# Patient Record
Sex: FEMALE | Race: WHITE | NOT HISPANIC OR LATINO | URBAN - METROPOLITAN AREA
[De-identification: names, ages, dates, MRNs, and addresses within clinical notes are randomized per-mention and may not be internally consistent; named-entity substitution may affect disease eponyms.]

---

## 2021-08-30 ENCOUNTER — EMERGENCY (EMERGENCY)
Facility: HOSPITAL | Age: 31
LOS: 0 days | Discharge: HOME | End: 2021-08-30
Attending: EMERGENCY MEDICINE | Admitting: EMERGENCY MEDICINE
Payer: COMMERCIAL

## 2021-08-30 VITALS
OXYGEN SATURATION: 99 % | SYSTOLIC BLOOD PRESSURE: 131 MMHG | WEIGHT: 134.92 LBS | HEIGHT: 66 IN | RESPIRATION RATE: 18 BRPM | DIASTOLIC BLOOD PRESSURE: 74 MMHG | TEMPERATURE: 99 F | HEART RATE: 80 BPM

## 2021-08-30 DIAGNOSIS — R00.1 BRADYCARDIA, UNSPECIFIED: ICD-10-CM

## 2021-08-30 DIAGNOSIS — N93.9 ABNORMAL UTERINE AND VAGINAL BLEEDING, UNSPECIFIED: ICD-10-CM

## 2021-08-30 DIAGNOSIS — Z79.3 LONG TERM (CURRENT) USE OF HORMONAL CONTRACEPTIVES: ICD-10-CM

## 2021-08-30 DIAGNOSIS — Z87.42 PERSONAL HISTORY OF OTHER DISEASES OF THE FEMALE GENITAL TRACT: ICD-10-CM

## 2021-08-30 LAB
ALBUMIN SERPL ELPH-MCNC: 4.5 G/DL — SIGNIFICANT CHANGE UP (ref 3.5–5.2)
ALP SERPL-CCNC: 66 U/L — SIGNIFICANT CHANGE UP (ref 30–115)
ALT FLD-CCNC: 33 U/L — SIGNIFICANT CHANGE UP (ref 0–41)
ANION GAP SERPL CALC-SCNC: 10 MMOL/L — SIGNIFICANT CHANGE UP (ref 7–14)
APPEARANCE UR: ABNORMAL
AST SERPL-CCNC: 26 U/L — SIGNIFICANT CHANGE UP (ref 0–41)
BACTERIA # UR AUTO: NEGATIVE — SIGNIFICANT CHANGE UP
BASOPHILS # BLD AUTO: 0.07 K/UL — SIGNIFICANT CHANGE UP (ref 0–0.2)
BASOPHILS NFR BLD AUTO: 0.8 % — SIGNIFICANT CHANGE UP (ref 0–1)
BILIRUB SERPL-MCNC: 0.3 MG/DL — SIGNIFICANT CHANGE UP (ref 0.2–1.2)
BILIRUB UR-MCNC: NEGATIVE — SIGNIFICANT CHANGE UP
BLD GP AB SCN SERPL QL: SIGNIFICANT CHANGE UP
BUN SERPL-MCNC: 12 MG/DL — SIGNIFICANT CHANGE UP (ref 10–20)
CALCIUM SERPL-MCNC: 9.5 MG/DL — SIGNIFICANT CHANGE UP (ref 8.5–10.1)
CHLORIDE SERPL-SCNC: 103 MMOL/L — SIGNIFICANT CHANGE UP (ref 98–110)
CO2 SERPL-SCNC: 26 MMOL/L — SIGNIFICANT CHANGE UP (ref 17–32)
COLOR SPEC: ABNORMAL
CREAT SERPL-MCNC: 0.9 MG/DL — SIGNIFICANT CHANGE UP (ref 0.7–1.5)
DIFF PNL FLD: ABNORMAL
EOSINOPHIL # BLD AUTO: 0.3 K/UL — SIGNIFICANT CHANGE UP (ref 0–0.7)
EOSINOPHIL NFR BLD AUTO: 3.3 % — SIGNIFICANT CHANGE UP (ref 0–8)
EPI CELLS # UR: 1 /HPF — SIGNIFICANT CHANGE UP (ref 0–5)
GLUCOSE SERPL-MCNC: 90 MG/DL — SIGNIFICANT CHANGE UP (ref 70–99)
GLUCOSE UR QL: NEGATIVE — SIGNIFICANT CHANGE UP
HCG SERPL QL: NEGATIVE — SIGNIFICANT CHANGE UP
HCT VFR BLD CALC: 37 % — SIGNIFICANT CHANGE UP (ref 37–47)
HGB BLD-MCNC: 12.2 G/DL — SIGNIFICANT CHANGE UP (ref 12–16)
HYALINE CASTS # UR AUTO: 0 /LPF — SIGNIFICANT CHANGE UP (ref 0–7)
IMM GRANULOCYTES NFR BLD AUTO: 0.1 % — SIGNIFICANT CHANGE UP (ref 0.1–0.3)
KETONES UR-MCNC: NEGATIVE — SIGNIFICANT CHANGE UP
LEUKOCYTE ESTERASE UR-ACNC: NEGATIVE — SIGNIFICANT CHANGE UP
LYMPHOCYTES # BLD AUTO: 3.75 K/UL — HIGH (ref 1.2–3.4)
LYMPHOCYTES # BLD AUTO: 40.8 % — SIGNIFICANT CHANGE UP (ref 20.5–51.1)
MCHC RBC-ENTMCNC: 27.9 PG — SIGNIFICANT CHANGE UP (ref 27–31)
MCHC RBC-ENTMCNC: 33 G/DL — SIGNIFICANT CHANGE UP (ref 32–37)
MCV RBC AUTO: 84.7 FL — SIGNIFICANT CHANGE UP (ref 81–99)
MONOCYTES # BLD AUTO: 0.53 K/UL — SIGNIFICANT CHANGE UP (ref 0.1–0.6)
MONOCYTES NFR BLD AUTO: 5.8 % — SIGNIFICANT CHANGE UP (ref 1.7–9.3)
NEUTROPHILS # BLD AUTO: 4.53 K/UL — SIGNIFICANT CHANGE UP (ref 1.4–6.5)
NEUTROPHILS NFR BLD AUTO: 49.2 % — SIGNIFICANT CHANGE UP (ref 42.2–75.2)
NITRITE UR-MCNC: NEGATIVE — SIGNIFICANT CHANGE UP
NRBC # BLD: 0 /100 WBCS — SIGNIFICANT CHANGE UP (ref 0–0)
PH UR: 6 — SIGNIFICANT CHANGE UP (ref 5–8)
PLATELET # BLD AUTO: 349 K/UL — SIGNIFICANT CHANGE UP (ref 130–400)
POTASSIUM SERPL-MCNC: 4.3 MMOL/L — SIGNIFICANT CHANGE UP (ref 3.5–5)
POTASSIUM SERPL-SCNC: 4.3 MMOL/L — SIGNIFICANT CHANGE UP (ref 3.5–5)
PROT SERPL-MCNC: 6.8 G/DL — SIGNIFICANT CHANGE UP (ref 6–8)
PROT UR-MCNC: SIGNIFICANT CHANGE UP
RBC # BLD: 4.37 M/UL — SIGNIFICANT CHANGE UP (ref 4.2–5.4)
RBC # FLD: 12 % — SIGNIFICANT CHANGE UP (ref 11.5–14.5)
RBC CASTS # UR COMP ASSIST: 562 /HPF — HIGH (ref 0–4)
SODIUM SERPL-SCNC: 139 MMOL/L — SIGNIFICANT CHANGE UP (ref 135–146)
SP GR SPEC: 1.01 — SIGNIFICANT CHANGE UP (ref 1.01–1.03)
UROBILINOGEN FLD QL: SIGNIFICANT CHANGE UP
WBC # BLD: 9.19 K/UL — SIGNIFICANT CHANGE UP (ref 4.8–10.8)
WBC # FLD AUTO: 9.19 K/UL — SIGNIFICANT CHANGE UP (ref 4.8–10.8)
WBC UR QL: 2 /HPF — SIGNIFICANT CHANGE UP (ref 0–5)

## 2021-08-30 PROCEDURE — 93010 ELECTROCARDIOGRAM REPORT: CPT

## 2021-08-30 PROCEDURE — 99284 EMERGENCY DEPT VISIT MOD MDM: CPT

## 2021-08-30 RX ORDER — SODIUM CHLORIDE 9 MG/ML
1000 INJECTION, SOLUTION INTRAVENOUS ONCE
Refills: 0 | Status: COMPLETED | OUTPATIENT
Start: 2021-08-30 | End: 2021-08-30

## 2021-08-30 RX ADMIN — SODIUM CHLORIDE 1000 MILLILITER(S): 9 INJECTION, SOLUTION INTRAVENOUS at 20:39

## 2021-08-30 NOTE — ED PROVIDER NOTE - OBJECTIVE STATEMENT
30 yo female, no pmh, presents to ed for vaginal bleeding, on menstrual cycle, hx of heavy cycles, was better controlled on ocps, now off ocps in an attempt to get pregnant, no specific pain or radiation, states felt lightheaded. Denies fever, chills, cp, sob, abd pain, nvd, dysuria.

## 2021-08-30 NOTE — ED PROVIDER NOTE - PATIENT PORTAL LINK FT
You can access the FollowMyHealth Patient Portal offered by [facility] by registering at the following website: [email or web address]. By joining [employer]’s FollowMyHealth portal, you will also be able to view your health information using other applications (apps) compatible with our system.

## 2021-08-30 NOTE — ED PROVIDER NOTE - PHYSICAL EXAMINATION
Physical Exam    Vital Signs: I have reviewed the initial vital signs.  Constitutional: well-nourished, appears stated age, no acute distress  Eyes: Conjunctiva pink, Sclera clear,   Cardiovascular: S1 and S2, regular rate, regular rhythm, well-perfused extremities, radial pulses equal and 2+  Respiratory: unlabored respiratory effort, clear to auscultation bilaterally no wheezing, rales and rhonchi  Gastrointestinal: soft, non-tender abdomen, no pulsatile mass, normal bowl sounds  : Chaperoned w Dr. Costa, no externa lesions, small amount of bleeding in vault, cervix closed, no cmt.   Musculoskeletal: supple neck, no lower extremity edema, no midline tenderness  Integumentary: warm, dry, no rash  Neurologic: awake, alert, nvi

## 2021-08-30 NOTE — ED PROVIDER NOTE - NSFOLLOWUPCLINICS_GEN_ALL_ED_FT
[facility]  OB/GYN  [address] [place] [address]  Phone: [phone]  Fax:   Follow Up Time: 1-3 Days

## 2021-08-30 NOTE — ED ADULT NURSE NOTE - CHIEF COMPLAINT QUOTE
Pt reports increasing vaginal bleeding over the last two days. PT states that she has a medical condition - unsure of what it is called - that makes her bleed heavy during her menstrual cycle, however the bleeding is increasingly worse at this time. Pt also reports dizziness as well. No CP, SOB, body aches or fevers.

## 2021-08-30 NOTE — ED ADULT TRIAGE NOTE - CHIEF COMPLAINT QUOTE
Pt reports increasing vaginal bleeding over the last two days. PT states that she has a medical condition that makes her bleed heavy during her menstrual cycle, however the bleeding is increasingly worse at this time. Pt also reports dizziness as well. No CP, SOB, body aches or fevers. Pt reports increasing vaginal bleeding over the last two days. PT states that she has a medical condition - unsure of what it is called - that makes her bleed heavy during her menstrual cycle, however the bleeding is increasingly worse at this time. Pt also reports dizziness as well. No CP, SOB, body aches or fevers.

## 2021-08-30 NOTE — ED PROVIDER NOTE - ATTENDING CONTRIBUTION TO CARE
30 yo F presents to ED for vaginal bleeding. Pt states that when she is not on OCPs her menstrual cycles are worse and pt has been off OCPs recently because she is trying to get pregnant. Pt does not have any SOB, palpitations, lightheadedness. No nausea or vomiting. +abdominal cramps     Const: Well nourished, well developed, appears stated age  Eyes: PERRL, no conjunctival injection  HENT:  Neck supple without meningismus   CV: RRR, Warm, well-perfused extremities  RESP: CTA B/L, no tachypnea   GI: soft, non-tender, non-distended  : os closed. Blood in vaginal vault. No active bleeding   MSK: No gross deformities appreciated  Skin: Warm, dry. No rashes    will do labs and pregnancy test

## 2021-08-30 NOTE — ED PROVIDER NOTE - CLINICAL SUMMARY MEDICAL DECISION MAKING FREE TEXT BOX
32 yo F presented to ED for vaginal bleeding. Pt not pregnant. Cramping and vaginal bleeding is due to her menstrual period. DC home

## 2021-08-30 NOTE — ED ADULT NURSE NOTE - OBJECTIVE STATEMENT
pt c/o increased vaginal bleeding during menstrual cycle. states she has intermittent dizziness, ambulated with steady gait , denies n/v/d

## 2021-08-30 NOTE — ED PROVIDER NOTE - NS ED ROS FT
Constitutional: (-) fever, (-) chills  Eyes: (-) visual changes  ENT: (-) nasal congestions  Cardiovascular: (-) chest pain, (-) syncope  Respiratory: (-) cough, (-) shortness of breath, (-) dyspnea,   Gastrointestinal: (-) vomiting, (-) diarrhea, (-)nausea,  Musculoskeletal: (-) neck pain, (-) back pain, (-) joint pain,  Integumentary: (-) rash, (-) edema, (-) bruises  Neurological: (-) headache, (-) loc, (-) dizziness, (-) tingling, (-)numbness  Peripheral Vascular: (-) leg swelling  :  (-)dysuria,  (-) hematuria, (+) vaginal bleeding.   Allergic/Immunologic: (-) pruritus

## 2021-09-08 NOTE — ED ADULT TRIAGE NOTE - STATUS:
work:  Has Edmund Juarez returned to work? [x] Yes    [] No    *Required MET Level achieved for job duties? [x] Yes    [] No   Orthopedic Limitations/  [x] Yes    [] No  If yes please list:  Pin in hip, foot pain      Orthopedic Limitations  *If patient has orthopedic issue:   Actions/  accomodations needed to make Edmund Juarez successful : n/a Orthopedic Limitations      Fall Risk  Fall risk assessed? [x] Yes      [] No    Balance Issues? [] Yes      [x] No   [x] Safety issues reviewed      Fall Risk  *If patient is a fall risk, action needed to accommodate:   n/a Fall Risk   Home Exercise  [] Yes    [x] No   Home Exercise  [x] Yes    [] No  Type: walking  Frequency:3x/wk  Duration: 35 min Home Exercise  [x] Yes    [] No  Type: walking  Frequency: 3d/wk  Duration: 35min   Angina with Activity? [] Yes    [x] No   Angina with Activity? [] Yes    [x] No   Angina with Activity?   [] Yes    [x] No     EXERCISE PLAN EXERCISE PLAN EXERCISE PLAN   *Interventions* *Interventions* *Interventions*   Exercise Prescription  (per physician & CR staff) Exercise Prescription  (per physician & CR staff) Exercise Prescription  (per physician & CR staff)   Cardiovascular Cardiovascular Cardiovascular   Mode:    [x] Treadmill (TM)  [x] Schwinn Airdyne (AD)  [x] Arms Ergometer (AE)   MODE:    [x] Treadmill (TM)  [x] Schwinn Airdyne (AD)  [x] Arms Ergometer (AE)  [] NuStep  [] Elliptical (E) MODE:    [x] Treadmill (TM)  [x] Schwinn Airdyne (AD)  [x] Arms Ergometer (AE)  [] NuStep  [] Elliptical (E)   Initial Workloads  TM: [email or web address] 2.8 METs  AD: 1.8 level = 2.8 METs  NS: 78  Flores= 2.6 METs  AE: 0.9 level = 1.9 METs Current Workloads  TM:  3.8@ 2%= 7.3 METs  AD: 2.7 level = 3.8 METs  AE: 1.0 level = 2.0 METs Current Workloads  TM: 4.0 @ 0%= 7.1 METs  AD: 2.4 level = 3.5 METs  AE: 2.0 level = 3.5 METs   Frequency:    ICR: 3x/week  Home: 2-3x/wk Frequency:   ICR: 3x/week  Home: 3x/wk Frequency:  Edmund Loving will continue exercise at  5-7 days/week Duration:   Total aerobic exercise = 21-29 min    8min/mode Duration:  Total aerobic exercises = 30-45 min     15 min/mode Duration:  Total erobic exercise =  60-90 min   Intensity:   MET Level = 2.8  RPE = 12-15 Intensity:  Max MET Level = 7.3  RPE = 12-15 Intensity:  Max MET Level = 7.3 RPE = 12-15   Progression: increase aerobic activity up to 15 min over next 4 weeks by increasing time 2-3 min/week. Progression:  Pt progressing quickly. Will increase weekly as tolerated. Progression:  Increase time/intensity when RPE <13, and HR is in [facility]   [x] Yes      [] No  Upper and Lower body strength training 2x/wk    Wt: 2#       Reps: 8-15    *Increase wt. after completing 15 reps with an RPE of <12/13. [x] Yes      [] No  Upper and Lower body strength training 2x/wk    Wt: 5#       Reps:  8-15    *Increase wt. after completing 15 reps with an RPE of <12/13. [x] Yes      [] No  Upper and Lower body strength training 2x/wk    Wt: 5#       Reps:  8-15    *Increase wt. after completing 15 reps with an RPE of <12/13.    Flexibility Flexibility Flexibility   [x] Yes      [] No  25 min session of Core Strength & Flexibility 1x/per week  Attends Core Strength & Flexibility   [x] Yes      [] No Continue Core Strength & Flexibility at home   Home Exercise  *Mani Beltran verbalizes planning to think about initiating exercise at home [address] [place] [address] planning to continue current home walking Home Exercise  *Mani Beltran verbalizes his plan to continue walking at home 3d/wk for 30 min   *Education* *Education* *Education*   RPE Scale  [x] Yes      [] No  Exercise Safety  [x] Yes      [] No  Equipment Orientation  [x] Yes      [] No  S/S to Report  [x] Yes      [] No  Warm Up/Cool Down  [x] Yes      [] No  Home Exercise  [x] Yes      [] No  All Exercise Education Completed  [x] Yes      [] No   Exercise Education Recommended    Workshops  *Improving Performance  *Balance Training & Fall Prevention  *Heart Disease Risk Reduction  *Resistance Training & Core Strength Exercise Education Attended/Date    8/2/2021  Heart Disease Risk Reduction All Sessions Completed? [x] Yes  [] No    [facility] 8/23/2021   *Goals* *Goals* *Goals*   Initial Exercise  Exercise Goals Exercise Goals    Mejia plans to:  [x] Attend exercise sessions 3x/wk  [x] initiate home exercise 2-3x/wk for 10-20 min  [x] Increase 6 min walk distance by 10%  [x] Attend Exercise workshops Herberth Keane plans to:  [x] Attend exercise sessions 3x/wk  [x] Continue home exercise 2-3x/wk for 20-30 min  [x] Increase 6 min walk distance by 10%  [x] Attend Exercise workshops Herberth Keane achieved exercise goals? Yes    [] No    [x] Increased 6 min walk distance by 10%  [x] Currently exercising 30-60 min/day, 5-7days/wk   [x] Plans to continue exercise on own  [] Plans to join a local fitness center to continue exercise  [] Does not plan to continue to exercise after rehab   Return to ADL or Hobbies:  Herberth Keane would like to improve strength and endurance. Return to ADL or Hobbies:  Herberth Keane has increased his strength and endurance. We will continue to work on improving. Return to ADL or Hobbies:  Herberth Keane has increased his strength and endurance     *MET level required for above goal:  4.0 METs MET level Achieved:  7. 3METs MET level Achieved:  7. 3METs     Individual Cardiac Treatment Plan - Nutrition  NUTRITION  ASSESSMENT/PLAN NUTRITION  REASSESSMENT NUTRITION  DISCHARGE/FOLLOW-UP   Stages of Change Stages of Change Stages of Change   [] Pre Contemplation  [] Contemplation  [x] Preparation  [] Action  [] Maintenance  [] Relapse [] Pre Contemplation  [] Contemplation  [] Preparation  [x] Action  [] Maintenance  [] Relapse [] Pre Contemplation  [] Contemplation  [] Preparation  [x] Action  [] Maintenance  [] Relapse   NUTRITION ASSESSMENT NUTRITION ASSESSMENT NUTRITION ASSESSMENT   Weight Management  Weight: 308        Height: 6'2\"   BMI: 40 Weight Management  Weight: 306                  Weight Management  Weight: 303                 Height: 6'2\"  BMI: 38.9   Eating Plan  Current eating habits: cut out red meats, more fruits and veggies, more turkey and chicken, cut out pop and monsters Eating Plan  Changes: same Eating Plan Improvements: more veggies, fresh fruits and fish   Alcohol Use  [] none          [] daily  [x] weekly      [] special   Type: one drink on weekend. Diet Assessment Tool:  RATE YOUR PLATE  *Given to patient to complete and return. Diet Assessment Tool:    Score: 61/69     Diet Assessment Tool: RATE YOUR PLATE  Score: 33/78     NUTRITION PLAN NUTRITION PLAN NUTRITION PLAN   *Interventions* *Interventions* *Interventions*   Initial Survey given Goal Setting Discussion:   [x] Yes      [] No     Follow Up Survey Reviewed & Goals Updated:     Professional Referral  Please check if needed. [] Dietitian Consult   [] Wt. Management Referral  [] Other:     Professional Referral  Please check if needed. [] Dietitian Consult   [] Wt. Management Referral  [] Other:  Professional Referral  Please check if needed. [] Dietitian Consult   [] Wt. Management Referral  [] Other:    *Education* *Education* *Education*   Nutritional Education Recommended    [x]1:1 Registered Dietitian    Workshops: 2  *Label Reading   *Menu  *Targeting Nutrition Priorities  *Fueling a Healthy Body Nutritional Education   Attended/Date    8/4/2021  1:1 Registered Dietitian All Sessions Completed?     [x] Yes  [] No    Fueling and healthy body 9/1/2021    Menu 8/11/2021   Cooking School    (x)5 sessions recommended     [] Adding Flavor  [] Fast & Healthy     Breakfasts  [] Salads & Dressings  [] Mamadou Madlonado  [] Simple Sides & Sauces  [] Appetizers &     Snacks  [] Delicious Desserts  [] Plant-Based Proteins  [] Fast Evening Meals  [] Weekend Breakfasts  [] Efficiency Cooking  [] One-Pot [employer] Completed    Attended/Date  7/30/21  Salads & Dressings    8/6/2021  Claribel Sher    8/13/2021  Simple Sides & Sauces   Cooking School    # of sessions completed:  5    apps and snacks 8/20/2021    Plant protein 9/3/2021   *Goals* *Goals* *Goals*   Nii nutritional goals are as follows:  [x] Attend Nutrition Workshops  [x] Attend 1:1   [x] Attend Cooking Classes  [x] Complete and return diet survey Nii nutritional goals are as follows:  [x] Attend Nutrition Workshops  [x] Attend 1:1   [x] Attend Cooking Classes  [] ** Alessandra Heredia achieved nutritional goals   [x] Yes    [] No  If no, why? Use knowledge gained to continue Pritikin eating plan at home     Individual Cardiac Treatment Plan - Psychosocial  PSYCHOSOCIAL  ASSESSMENT/PLAN PSYCHOSOCIAL  REASSESSMENT PSYCHOSOCIAL  DISCHARGE/FOLLOW-UP   Stages of Change Stages of Change Stages of Change   [] Pre Contemplation  [x] Contemplation  [] Preparation  [] Action  [] Maintenance  [] Relapse [] Pre Contemplation  [] Contemplation  [x] Preparation  [] Action  [] Maintenance  [] Relapse [] Pre Contemplation  [] Contemplation  [x] Preparation  [] Action  [] Maintenance  [] Relapse   PSYCHOSOCIAL ASSESSMENT PSYCHOSOCIAL ASSESSMENT PSYCHOSOCIAL ASSESSMENT   Behavioral Outcomes Behavioral Outcomes Behavioral Outcomes   Tool Used:  Anand & David, Quality of Life Index, Cardiac Version IV  *Given to patient to complete. Tool Used:    Anand & David, Quality of Life Index, Cardiac Version IV   QOL Index Score: 29  HF:28  S&E:30  P&S: 34  Family: 29 Tool Used:     Anand & Hernandez, Quality of Life Index, Cardiac Version IV  QOL Index Score: 29.82    HF:29.6  S&E:30  P&S: 30  Family: 30   PHQ-9 score 1  Depression Severity  [x]Minimal  []Mild   []Moderate  []Moderately Severe    []Severe  PHQ-9 score 0  Depression Severity  []Minimal  []Mild   []Moderate  []Moderately Severe    []Severe   Does patient have Family Support?   [x] Yes      [] No  No signs of marital/family Workshops Attended/Date      2021  Taking Charge of Stress Healthy Mind-Set Workshops  Completed  [x] Yes      [] No    New thoughts 2021    Manage moods 2021   *Goals* *Goals* *Goals*   Nii psychosocial goals are as follows:  [x] Complete HADS & Anand Hernandez Quality of Life Index, Cardiac Version IV  [x] Attend Healthy Mind-Set Workshops  [x] Reduce depression symptom severity by 1 level Nii psychosocial goals are as follows:  [x] Attend Healthy Mind-Set Workshops  [x] Reduce depression symptom severity by 1 level  [] Yuli Goodrich achieved psychosocial goals?   [x] Yes    [] No  [] Use methods learned to continue to reduce depression symptom severity by 1 level             Individual Cardiac Treatment Plan - Other:  Risk Factor/Education  RISK FACTOR  ASSESSMENT/PLAN RISK FACTOR  REASSESSMENT  RISK FACTOR   DISCHARGE/FOLLOW-UP   Stages of Change Stages of Change Stages of Change   [] Pre Contemplation  [x] Contemplation  [] Preparation  [] Action  [] Maintenance  [] Relapse [] Pre Contemplation  [] Contemplation  [] Preparation  [x] Action  [] Maintenance  [] Relapse [] Pre Contemplation  [] Contemplation  [] Preparation  [x] Action  [] Maintenance  [] Relapse   RISK FACTOR/EDUCATION ASSESSMENT RISK FACTOR/EDUCATION ASSESSMENT RISK FACTOR /EDUCATION ASSESSMENT   Hypertension  [x] Yes      [] No    Resting BP: 144/84  Peak Ex BP:160/88  Medication: lisinopril   Hypertension  Resting BP: 150/82  Peak Ex BP:210/100  Medication Changes:  [] Yes      [x] No Hypertension  Resting BP: 126/82  Peak Ex BP:180/88  Medication Changes:  [] Yes      [x] No   Lipids  HLD/DLD  [x] Yes      [] No  TOTAL CHOL: 166  HDL:  33  LDL:  90  TRI  Medication: atorvastatin (LIPITOR) Lipids  Medication Changes:  [] Yes      [x] No     Lipids  No new redraw     Medication Changes:  [] Yes      [x] No   Diabetes  [] Yes      [x] No    Diabetes       Tobacco Use  [x]Current  []Former  []Never     Date Quit: by thanksgiving     Quit date set:  [x] Yes      [] No    # cigarettes smoked/day: 0.5per day     Smokeless Tobacco use:   [x] Yes      [] No  Amount: 0.5can per day  Tobacco Use  Change in smoking status   [] Yes      [x] No       Tobacco Use  Change in smoking status   [x] Yes      [] No    Quit date: 9/4/2021          Learning Barriers  Please select one:  []Speech  []Literacy  []Hearing  []Cognitive  []Vision  [x]Ready to Learn Learning Barriers Addressed:   Ready to learn   Learning Barriers Addressed:  [x] Yes      [] No     RISK FACTOR/EDUCATION PLAN RISK FACTOR/EDUCATION PLAN RISK FACTOR/EDUCATION PLAN   *Interventions* *Interventions* *Interventions*   Core Educational Videos    [x] Overview of The Pritikin Eating Plan  [] Heart Disease Risk Reduction  [] Move It! [] Calorie Density  [] Healthy Minds, Bodies, Hearts  [] Label Reading  [] Metabolic Syndrome & Belly   Or  [] How Our Thoughts Can Heal our Heart  [] Dining Out-Part 1  [] Biomechanical Limitations  [] Facts on Fat  [] Hypertension & Heart Disease  [] Diseases of Our Times-Focusing on Diabetes  [] Body Composition  [] Nurtition Action Plan  [] Exercise Action Plan     Completed Videos/Date      7/20/21  Overview of The Pritikin Eating Plan    7/26/2021   Heart Disease Risk Reduction    8/9/2021  Move It!    8/16/2021  Hypertension & Heart Disease Recommended Educational Videos Completed    [x] Yes      [] No    Label reading 8/25/2021            Smoking Cessation/Relaspe Prevention Intervention needed?   [x] Yes      [] No  *Pt verbalizes and agrees to attend intervention    Smoking Cessation Counseling attended  [] Yes      [x] No     Professional Referrals:  *Please check if needed  [] Diabetes Clinic  [] Lipid Clinic   [] Other:   Professional Referrals:  *Please check if needed  [] Diabetes Clinic  [] Lipid Clinic   [] Other:   Preventative Medication Preventative Medication Preventative Medication   Aspirin  [x] Yes    [] No  Blood Thinner: reasonably and medically necessary for continuous cardiac monitoring surveillance  of patient's cardiac activity  [x] Initiate continuous telemetry monitoring and notify me with any concerns  [] Other   Physician Response    [x] Cardiac rehab is reasonably and medically necessary for continuous cardiac monitoring surveillance  of patient's cardiac activity  [x] Continue continuous telemetry monitoring and notify me with any concerns  [] Other          Cosigned by: Shayna Byers MD at 7/20/2021  9:29 PM   Revision History  Date/Time User Provider Type Action   7/20/2021  9:29 PM Shayna Byers MD Physician Cosign   7/20/2021  3:23 PM Misty Mock Exercise Physiologist Sign     Cosigned by: Shayna Byers MD at 8/16/2021  4:11 PM   Revision History  Date/Time User Provider Type Action   8/16/2021  4:11 PM Shayna Byers MD Physician Cosign   8/16/2021  3:26 PM Felicia Holguin Exercise Physiologist Sign

## 2022-03-09 ENCOUNTER — OUTPATIENT (OUTPATIENT)
Dept: OUTPATIENT SERVICES | Facility: HOSPITAL | Age: 32
LOS: 1 days | Discharge: HOME | End: 2022-03-09

## 2022-03-09 VITALS
TEMPERATURE: 99 F | DIASTOLIC BLOOD PRESSURE: 63 MMHG | SYSTOLIC BLOOD PRESSURE: 119 MMHG | RESPIRATION RATE: 16 BRPM | HEART RATE: 95 BPM

## 2022-03-09 VITALS — HEART RATE: 95 BPM | DIASTOLIC BLOOD PRESSURE: 63 MMHG | SYSTOLIC BLOOD PRESSURE: 119 MMHG

## 2022-03-09 DIAGNOSIS — Z90.49 ACQUIRED ABSENCE OF OTHER SPECIFIED PARTS OF DIGESTIVE TRACT: Chronic | ICD-10-CM

## 2022-03-09 LAB
APPEARANCE UR: CLEAR — SIGNIFICANT CHANGE UP
BACTERIA # UR AUTO: NEGATIVE — SIGNIFICANT CHANGE UP
BILIRUB UR-MCNC: NEGATIVE — SIGNIFICANT CHANGE UP
BLD GP AB SCN SERPL QL: SIGNIFICANT CHANGE UP
COLOR SPEC: SIGNIFICANT CHANGE UP
DIFF PNL FLD: ABNORMAL
EPI CELLS # UR: 10 /HPF — HIGH (ref 0–5)
FIBRONECTIN FETAL SPEC QL: NEGATIVE — SIGNIFICANT CHANGE UP
GLUCOSE UR QL: NEGATIVE — SIGNIFICANT CHANGE UP
HCT VFR BLD CALC: 34.6 % — LOW (ref 37–47)
HGB BLD-MCNC: 11.4 G/DL — LOW (ref 12–16)
HIV 1 & 2 AB SERPL IA.RAPID: SIGNIFICANT CHANGE UP
HYALINE CASTS # UR AUTO: 8 /LPF — HIGH (ref 0–7)
KETONES UR-MCNC: NEGATIVE — SIGNIFICANT CHANGE UP
LEUKOCYTE ESTERASE UR-ACNC: ABNORMAL
MCHC RBC-ENTMCNC: 27.7 PG — SIGNIFICANT CHANGE UP (ref 27–31)
MCHC RBC-ENTMCNC: 32.9 G/DL — SIGNIFICANT CHANGE UP (ref 32–37)
MCV RBC AUTO: 84 FL — SIGNIFICANT CHANGE UP (ref 81–99)
NITRITE UR-MCNC: NEGATIVE — SIGNIFICANT CHANGE UP
NRBC # BLD: 0 /100 WBCS — SIGNIFICANT CHANGE UP (ref 0–0)
PH UR: 6.5 — SIGNIFICANT CHANGE UP (ref 5–8)
PLATELET # BLD AUTO: 332 K/UL — SIGNIFICANT CHANGE UP (ref 130–400)
PRENATAL SYPHILIS TEST: SIGNIFICANT CHANGE UP
PROT UR-MCNC: SIGNIFICANT CHANGE UP
RBC # BLD: 4.12 M/UL — LOW (ref 4.2–5.4)
RBC # FLD: 12.1 % — SIGNIFICANT CHANGE UP (ref 11.5–14.5)
RBC CASTS # UR COMP ASSIST: 5 /HPF — HIGH (ref 0–4)
SARS-COV-2 RNA SPEC QL NAA+PROBE: SIGNIFICANT CHANGE UP
SP GR SPEC: 1.01 — SIGNIFICANT CHANGE UP (ref 1.01–1.03)
UROBILINOGEN FLD QL: SIGNIFICANT CHANGE UP
WBC # BLD: 14.83 K/UL — HIGH (ref 4.8–10.8)
WBC # FLD AUTO: 14.83 K/UL — HIGH (ref 4.8–10.8)
WBC UR QL: 3 /HPF — SIGNIFICANT CHANGE UP (ref 0–5)

## 2022-03-09 NOTE — OB PROVIDER TRIAGE NOTE - ADDITIONAL INSTRUCTIONS
If you have worsening contractions, vaginal bleeding, leakage of fluid, or don't feel your baby move as much as normal, call your doctor or return to Labor and Delivery. Follow up with your OBGYN at next scheduled appointment.

## 2022-03-09 NOTE — OB PROVIDER TRIAGE NOTE - HISTORY OF PRESENT ILLNESS
32y  at 27w4d dated by LMP c/w first trimester sonogram presenting due to contractions. She reports since Saturday, occurring everyday from about 1500 to 1900, irregular, rated 5/10. Denies LOF, VB. Good FM. Denies complications this pregnancy. Patient reports her mother has had many children and thought nothing of these contractions as her mother also had  contractions. During her prenatal appointment, contractions were noted on NST and patient was sent in from office. H/o anxiety on wellbutrin. Reports anatomy sonogram was 2 weeks ago and her cervix measured "two and three quarters" (2.75cm). Denies other complications. Reports chronic constipation this pregnancy. Denies HA, dizziness, fevers or chills, No cough, wheezing, No shortness of breath, No chest pain or palpitations, No nausea, vomiting, No dysuria, frequency or hematuria, no abnormal vaginal bleeding or discharge.   Last BM: today @ 1100  Last PO: @ 1545  Last intercourse: 3 weeks ago

## 2022-03-09 NOTE — OB PROVIDER TRIAGE NOTE - NSHPLABSRESULTS_GEN_ALL_CORE
10/3/21  Type and Screen: Apos  Antibody screen: neg   Rubella: immune  RPR: neg  HbSAg: neg  HIV: NR

## 2022-03-09 NOTE — OB PROVIDER TRIAGE NOTE - NSOBPROVIDERNOTE_OBGYN_ALL_OB_FT
32y  at 27w4d, GBS unknown, with  contractions, r/o  labor  -IVF hydration, reassess in 2-4 hrs  -will send fFN, GBS swab, and vaginitis  -admission labs drawn  -Ua, Ucx    Dr. Gaines and Dr. Carlos aware 32y  at 27w4d, GBS unknown, with  contractions, r/o  labor  -IVF hydration, reassess in 2-4 hrs  -will send fFN, GBS swab, and vaginitis  -admission labs drawn  -Ua, Ucx    Dr. Gaines and Dr. Carlos aware    ADDENDUM: Patient reevaluated at bedside s/p 1L IV hydration. Reports she is no longer feeling contractions, has only felt 1 contraction over the last 2 hours. FHR reactive, no contractions on toco. SVE unchanged C/L/P. Will discharge home with PTL precautions, PO hydration encouraged. Follow up with OBGYN at next scheduled appointment. WIll follow up FFN, GBS, vaginitis.    Discussed with Dr. Carlos

## 2022-03-09 NOTE — OB PROVIDER TRIAGE NOTE - NSHPPHYSICALEXAM_GEN_ALL_CORE
T(C): 37.1 (03-09-22 @ 15:51), Max: 37.1 (03-09-22 @ 15:51)  HR: 95 (03-09-22 @ 15:51) (95 - 95)  BP: 119/63 (03-09-22 @ 15:51) (119/63 - 119/63)  RR: 16 (03-09-22 @ 15:51) (16 - 16)      Gen: A+OX3. NAD  Cardio: RRR  Resp: CTAB  Abd: Soft, Nontender. Gravid. Mild palpable contractions  SVE: C/L/P per Dr. Smith  Speculum: wnl, cervical ectropion with 10-20cc of bleeding upon speculum placement controlled with pressure and silver nitrate  BSS: BPP 8/8, TVCVL 2.75cm    FHR: 145BPM/mod mauri/accels pos  TOCO: q3-5min

## 2022-03-09 NOTE — OB PROVIDER TRIAGE NOTE - NS_OBGYNHISTORY_OBGYN_ALL_OB_FT
OB: SABx1 no D+C  GYN: denies history of fibroids, ovarian cysts, abnormal papsmears. Chlamydia 7 years ago s/p tx and JARED

## 2022-03-11 LAB
CULTURE RESULTS: NO GROWTH — SIGNIFICANT CHANGE UP
GROUP B BETA STREP DNA (PCR): SIGNIFICANT CHANGE UP
GROUP B BETA STREP INTERPRETATION: SIGNIFICANT CHANGE UP
SOURCE GROUP B STREP: SIGNIFICANT CHANGE UP
SPECIMEN SOURCE: SIGNIFICANT CHANGE UP

## 2022-03-12 LAB
A VAGINAE DNA VAG QL NAA+PROBE: SIGNIFICANT CHANGE UP
BVAB2 DNA VAG QL NAA+PROBE: SIGNIFICANT CHANGE UP
C ALBICANS DNA VAG QL NAA+PROBE: NEGATIVE — SIGNIFICANT CHANGE UP
C GLABRATA DNA VAG QL NAA+PROBE: NEGATIVE — SIGNIFICANT CHANGE UP
C KRUSEI DNA VAG QL NAA+PROBE: NEGATIVE — SIGNIFICANT CHANGE UP
C LUSITANIAE DNA VAG QL NAA+PROBE: NEGATIVE — SIGNIFICANT CHANGE UP
C TRACH RRNA SPEC QL NAA+PROBE: NEGATIVE — SIGNIFICANT CHANGE UP
MEGA1 DNA VAG QL NAA+PROBE: SIGNIFICANT CHANGE UP
N GONORRHOEA RRNA SPEC QL NAA+PROBE: NEGATIVE — SIGNIFICANT CHANGE UP
T VAGINALIS RRNA SPEC QL NAA+PROBE: NEGATIVE — SIGNIFICANT CHANGE UP